# Patient Record
Sex: FEMALE | Race: WHITE | NOT HISPANIC OR LATINO
[De-identification: names, ages, dates, MRNs, and addresses within clinical notes are randomized per-mention and may not be internally consistent; named-entity substitution may affect disease eponyms.]

---

## 2018-01-10 ENCOUNTER — RESULT REVIEW (OUTPATIENT)
Age: 53
End: 2018-01-10

## 2019-08-13 ENCOUNTER — APPOINTMENT (OUTPATIENT)
Dept: OBGYN | Facility: CLINIC | Age: 54
End: 2019-08-13
Payer: MEDICARE

## 2019-08-13 ENCOUNTER — RECORD ABSTRACTING (OUTPATIENT)
Age: 54
End: 2019-08-13

## 2019-08-13 VITALS
WEIGHT: 138 LBS | SYSTOLIC BLOOD PRESSURE: 112 MMHG | HEIGHT: 64 IN | DIASTOLIC BLOOD PRESSURE: 60 MMHG | BODY MASS INDEX: 23.56 KG/M2

## 2019-08-13 DIAGNOSIS — J45.909 UNSPECIFIED ASTHMA, UNCOMPLICATED: ICD-10-CM

## 2019-08-13 DIAGNOSIS — Z87.39 PERSONAL HISTORY OF OTHER DISEASES OF THE MUSCULOSKELETAL SYSTEM AND CONNECTIVE TISSUE: ICD-10-CM

## 2019-08-13 DIAGNOSIS — N93.0 POSTCOITAL AND CONTACT BLEEDING: ICD-10-CM

## 2019-08-13 DIAGNOSIS — N89.8 OTHER SPECIFIED NONINFLAMMATORY DISORDERS OF VAGINA: ICD-10-CM

## 2019-08-13 DIAGNOSIS — J38.1 POLYP OF VOCAL CORD AND LARYNX: ICD-10-CM

## 2019-08-13 DIAGNOSIS — Z92.89 PERSONAL HISTORY OF OTHER MEDICAL TREATMENT: ICD-10-CM

## 2019-08-13 DIAGNOSIS — Z87.19 PERSONAL HISTORY OF OTHER DISEASES OF THE DIGESTIVE SYSTEM: ICD-10-CM

## 2019-08-13 DIAGNOSIS — Z80.42 FAMILY HISTORY OF MALIGNANT NEOPLASM OF PROSTATE: ICD-10-CM

## 2019-08-13 DIAGNOSIS — Z78.9 OTHER SPECIFIED HEALTH STATUS: ICD-10-CM

## 2019-08-13 DIAGNOSIS — Z82.49 FAMILY HISTORY OF ISCHEMIC HEART DISEASE AND OTHER DISEASES OF THE CIRCULATORY SYSTEM: ICD-10-CM

## 2019-08-13 DIAGNOSIS — Z87.42 PERSONAL HISTORY OF OTHER DISEASES OF THE FEMALE GENITAL TRACT: ICD-10-CM

## 2019-08-13 DIAGNOSIS — M51.9 UNSPECIFIED THORACIC, THORACOLUMBAR AND LUMBOSACRAL INTERVERTEBRAL DISC DISORDER: ICD-10-CM

## 2019-08-13 DIAGNOSIS — Z83.3 FAMILY HISTORY OF DIABETES MELLITUS: ICD-10-CM

## 2019-08-13 DIAGNOSIS — Z80.8 FAMILY HISTORY OF MALIGNANT NEOPLASM OF OTHER ORGANS OR SYSTEMS: ICD-10-CM

## 2019-08-13 DIAGNOSIS — Z82.62 FAMILY HISTORY OF OSTEOPOROSIS: ICD-10-CM

## 2019-08-13 DIAGNOSIS — Z80.0 FAMILY HISTORY OF MALIGNANT NEOPLASM OF DIGESTIVE ORGANS: ICD-10-CM

## 2019-08-13 DIAGNOSIS — G47.9 SLEEP DISORDER, UNSPECIFIED: ICD-10-CM

## 2019-08-13 DIAGNOSIS — M77.8 OTHER ENTHESOPATHIES, NOT ELSEWHERE CLASSIFIED: ICD-10-CM

## 2019-08-13 DIAGNOSIS — Z87.448 PERSONAL HISTORY OF OTHER DISEASES OF URINARY SYSTEM: ICD-10-CM

## 2019-08-13 LAB — CYTOLOGY CVX/VAG DOC THIN PREP: NORMAL

## 2019-08-13 PROCEDURE — G0101: CPT

## 2019-08-13 PROCEDURE — 99396 PREV VISIT EST AGE 40-64: CPT | Mod: GY

## 2019-08-13 RX ORDER — LACTOBACILLUS ACIDOPHILUS 0.5 MG
TABLET ORAL
Refills: 0 | Status: ACTIVE | COMMUNITY

## 2019-08-13 RX ORDER — THEOPHYLLINE 100 MG/1
100 TABLET, EXTENDED RELEASE ORAL
Refills: 0 | Status: ACTIVE | COMMUNITY

## 2019-08-13 RX ORDER — IBUPROFEN 800 MG
TABLET ORAL
Refills: 0 | Status: DISCONTINUED | COMMUNITY
End: 2019-08-13

## 2019-08-13 RX ORDER — LEVALBUTEROL TARTRATE 45 UG/1
45 AEROSOL, METERED ORAL
Refills: 0 | Status: ACTIVE | COMMUNITY

## 2019-08-13 RX ORDER — IBUPROFEN 600 MG
600 TABLET ORAL
Refills: 0 | Status: ACTIVE | COMMUNITY

## 2019-08-14 NOTE — REVIEW OF SYSTEMS
[Sight Problems] : sight problems [Abdominal Pain] : abdominal pain [Headache] : headache [Sleep Disturbances] : sleep disturbances [Nl] : Integumentary [Fever] : no fever [Dyspnea] : no shortness of breath [Chills] : no chills [Vomiting] : no vomiting [Pelvic Pain] : no pelvic pain [Frequency] : no frequency [Abn Vag Bleeding] : no abnormal vaginal bleeding [FreeTextEntry1] : motor disturbance sensory disturbance  low libido

## 2019-08-14 NOTE — PHYSICAL EXAM
[Awake] : awake [Alert] : alert [Mass] : no breast mass [Acute Distress] : no acute distress [Nipple Discharge] : no nipple discharge [Axillary LAD] : no axillary lymphadenopathy [Soft] : soft [Tender] : non tender [Distended] : not distended [Flat Affect] : affect not flat [Depressed Mood] : not depressed [Oriented x3] : oriented to person, place, and time [Normal] : uterus [Labia Majora] : labia major [Labia Minora] : labia minora [No Bleeding] : there was no active vaginal bleeding [Atrophy] : atrophy [Pap Obtained] : a Pap smear was performed [Tenderness] : nontender [Uterine Adnexae] : were not tender and not enlarged

## 2019-08-14 NOTE — HISTORY OF PRESENT ILLNESS
[1 Year Ago] : 1 year ago [Fair] : being in fair health [Healthy Diet] : a healthy diet [Last Mammogram ___] : Last Mammogram was [unfilled] [Last Pap ___] : Last cervical pap smear was [unfilled] [Last Bone Density ___] : Last bone density studies [unfilled] [Hot Flashes] : hot flashes [Definite:  ___ (Date)] : the last menstrual period was [unfilled] [Menarche Age: ____] : age at menarche was [unfilled] [Postmenopausal] : is postmenopausal [Regular Exercise] : not exercising regularly [Weight Concerns] : no concerns with her weight [Menstrual Problems] : reports normal menses [Pregnancy History] : denies prior pregnancies [de-identified] : pelvic ultrasound 2010 breast ultrasound 0/12/2018 [Night Sweats] : no night sweats [Vaginal Itching] : no vaginal itching [Dyspareunia] : no dyspareunia [Mood Changes] : no mood changes [Sexually Active] : is not sexually active [Contraception] : does not use contraception

## 2019-08-24 LAB
CYTOLOGY CVX/VAG DOC THIN PREP: ABNORMAL
HPV HIGH+LOW RISK DNA PNL CVX: NOT DETECTED

## 2020-03-11 ENCOUNTER — APPOINTMENT (OUTPATIENT)
Dept: OBGYN | Facility: CLINIC | Age: 55
End: 2020-03-11

## 2020-03-31 ENCOUNTER — APPOINTMENT (OUTPATIENT)
Dept: OBGYN | Facility: CLINIC | Age: 55
End: 2020-03-31

## 2020-08-17 ENCOUNTER — APPOINTMENT (OUTPATIENT)
Dept: OBGYN | Facility: CLINIC | Age: 55
End: 2020-08-17
Payer: MEDICARE

## 2020-08-17 VITALS
WEIGHT: 129 LBS | HEIGHT: 64 IN | BODY MASS INDEX: 22.02 KG/M2 | SYSTOLIC BLOOD PRESSURE: 110 MMHG | DIASTOLIC BLOOD PRESSURE: 68 MMHG

## 2020-08-17 DIAGNOSIS — Z12.31 ENCOUNTER FOR SCREENING MAMMOGRAM FOR MALIGNANT NEOPLASM OF BREAST: ICD-10-CM

## 2020-08-17 DIAGNOSIS — Z78.9 OTHER SPECIFIED HEALTH STATUS: ICD-10-CM

## 2020-08-17 DIAGNOSIS — K46.9 UNSPECIFIED ABDOMINAL HERNIA W/OUT OBSTRUCTION OR GANGRENE: ICD-10-CM

## 2020-08-17 DIAGNOSIS — M50.90 CERVICAL DISC DISORDER, UNSPECIFIED, UNSPECIFIED CERVICAL REGION: ICD-10-CM

## 2020-08-17 DIAGNOSIS — K57.32 DIVERTICULITIS OF LARGE INTESTINE W/OUT PERFORATION OR ABSCESS W/OUT BLEEDING: ICD-10-CM

## 2020-08-17 DIAGNOSIS — Z01.411 ENCOUNTER FOR GYNECOLOGICAL EXAMINATION (GENERAL) (ROUTINE) WITH ABNORMAL FINDINGS: ICD-10-CM

## 2020-08-17 DIAGNOSIS — K21.0 GASTRO-ESOPHAGEAL REFLUX DISEASE WITH ESOPHAGITIS: ICD-10-CM

## 2020-08-17 DIAGNOSIS — Z01.419 ENCOUNTER FOR GYNECOLOGICAL EXAMINATION (GENERAL) (ROUTINE) W/OUT ABNORMAL FINDINGS: ICD-10-CM

## 2020-08-17 DIAGNOSIS — I67.1 CEREBRAL ANEURYSM, NONRUPTURED: ICD-10-CM

## 2020-08-17 DIAGNOSIS — H04.129 DRY EYE SYNDROME OF UNSPECIFIED LACRIMAL GLAND: ICD-10-CM

## 2020-08-17 DIAGNOSIS — Z63.5 DISRUPTION OF FAMILY BY SEPARATION AND DIVORCE: ICD-10-CM

## 2020-08-17 PROCEDURE — 36415 COLL VENOUS BLD VENIPUNCTURE: CPT

## 2020-08-17 PROCEDURE — G0101: CPT

## 2020-08-17 PROCEDURE — 99396 PREV VISIT EST AGE 40-64: CPT | Mod: GY

## 2020-08-17 SDOH — SOCIAL STABILITY - SOCIAL INSECURITY: DISRUPTION OF FAMILY BY SEPARATION AND DIVORCE: Z63.5

## 2020-08-18 RX ORDER — ECHINACEA 400 MG
575 CAPSULE ORAL
Refills: 0 | Status: ACTIVE | COMMUNITY

## 2020-08-18 RX ORDER — UBIDECARENONE 50 MG
CAPSULE ORAL
Refills: 0 | Status: ACTIVE | COMMUNITY

## 2020-08-18 RX ORDER — ALPRAZOLAM 0.25 MG/1
0.25 TABLET ORAL
Qty: 90 | Refills: 0 | Status: ACTIVE | COMMUNITY
Start: 2020-05-11

## 2020-08-18 RX ORDER — EPINEPHRINE 0.3 MG/.3ML
0.3 INJECTION INTRAMUSCULAR
Qty: 2 | Refills: 0 | Status: ACTIVE | COMMUNITY
Start: 2020-07-24

## 2020-08-18 RX ORDER — DOXYCYCLINE 50 MG/1
50 TABLET, FILM COATED ORAL
Qty: 30 | Refills: 0 | Status: ACTIVE | COMMUNITY
Start: 2020-07-28

## 2020-08-18 NOTE — END OF VISIT
[FreeTextEntry3] : I, Mary Anne Narayanan, solely acted as scribe for Dr. Karlee Llanes on 08/17/2020 .\par All medical entries made by the Scribe were at my, Dr. Llanes's, direction and personally dictated by me on 08/17/2020. I have reviewed the chart and agree that the record accurately reflects my personal performance of the history, physical exam, assessment and plan. I have also personally directed, reviewed, and agreed with the chart.

## 2020-08-18 NOTE — REVIEW OF SYSTEMS
[Nl] : Integumentary [Joint Pain] : joint pain [SOB on Exertion] : shortness of breath during exertion [Dizziness] : dizziness [Sleep Disturbances] : sleep disturbances [Headache] : headache [Pelvic Pain] : no pelvic pain [Chills] : no chills [Fever] : no fever [FreeTextEntry1] : WEIGHT LOSS, ASTHMA  [Abn Vag Bleeding] : no abnormal vaginal bleeding

## 2020-08-18 NOTE — PHYSICAL EXAM
[Awake] : awake [Alert] : alert [Examination Of The Breasts] : a normal appearance [No Discharge] : no discharge [No Masses] : no breast masses were palpable [Soft] : soft [Oriented x3] : oriented to person, place, and time [Labia Majora] : labia major [Labia Minora] : labia minora [Normal] : clitoris [No Bleeding] : there was no active vaginal bleeding [Pap Obtained] : a Pap smear was performed [Uterine Adnexae] : were not tender and not enlarged [Patient Refused] : was refused by the patient [Acute Distress] : no acute distress [Mass] : no breast mass [Nipple Discharge] : no nipple discharge [Distended] : not distended [Axillary LAD] : no axillary lymphadenopathy [Tender] : non tender [Depressed Mood] : not depressed [Flat Affect] : affect not flat [Dry Mucosa] : dry mucosa [Atrophy] : atrophy [Tenderness] : nontender [Adnexa Tenderness] : were not tender

## 2020-08-18 NOTE — HISTORY OF PRESENT ILLNESS
[1 Year Ago] : 1 year ago [Good] : being in good health [Last Pap ___] : Last cervical pap smear was [unfilled] [Menarche Age: ____] : age at menarche was [unfilled] [Last Mammogram ___] : Last Mammogram was [unfilled] [Last Bone Density ___] : Last bone density studies [unfilled] [Postmenopausal] : is postmenopausal [Last Colonoscopy ___] : Last colonoscopy [unfilled] [No] : no [Menstrual Problems] : reports normal menses [Pregnancy History] : denies prior pregnancies [Contraception] : does not use contraception [Sexually Active] : is not sexually active

## 2020-08-19 LAB
C TRACH RRNA SPEC QL NAA+PROBE: NOT DETECTED
HAV IGM SER QL: NONREACTIVE
HBV CORE IGM SER QL: NONREACTIVE
HBV SURFACE AG SER QL: NONREACTIVE
HCV AB SER QL: NONREACTIVE
HCV S/CO RATIO: 0.09 S/CO
HIV1+2 AB SPEC QL IA.RAPID: NONREACTIVE
HPV HIGH+LOW RISK DNA PNL CVX: NOT DETECTED
N GONORRHOEA RRNA SPEC QL NAA+PROBE: NOT DETECTED
SOURCE TP AMPLIFICATION: NORMAL
T PALLIDUM AB SER QL IA: NEGATIVE

## 2020-08-21 LAB — CYTOLOGY CVX/VAG DOC THIN PREP: ABNORMAL

## 2020-12-23 PROBLEM — Z12.31 ENCOUNTER FOR SCREENING MAMMOGRAM FOR BREAST CANCER: Status: RESOLVED | Noted: 2019-08-13 | Resolved: 2020-12-23

## 2022-07-21 ENCOUNTER — APPOINTMENT (OUTPATIENT)
Dept: ORTHOPEDIC SURGERY | Facility: CLINIC | Age: 57
End: 2022-07-21

## 2022-07-21 VITALS — BODY MASS INDEX: 22.88 KG/M2 | WEIGHT: 134 LBS | HEIGHT: 64 IN

## 2022-07-21 PROCEDURE — 73080 X-RAY EXAM OF ELBOW: CPT | Mod: RT

## 2022-07-21 PROCEDURE — 99072 ADDL SUPL MATRL&STAF TM PHE: CPT

## 2022-07-21 PROCEDURE — 99205 OFFICE O/P NEW HI 60 MIN: CPT

## 2022-07-21 PROCEDURE — 73564 X-RAY EXAM KNEE 4 OR MORE: CPT | Mod: LT

## 2022-07-23 NOTE — HISTORY OF PRESENT ILLNESS
[de-identified] : The patient is a 56 year old right hand dominant female who presents today complaining of right elbow and left knee pain .\par Date of Injury/Onset: 10/14/2011\par Pain:    At Rest: 6/10 \par With Activity:  8/10 \par Mechanism of injury: slipped and fell on a leak at work injuring her right elbow and left knee\par This is a Work Related Injury being treated under Worker's Compensation.\par This is not an athletic injury occurring associated with an interscholastic or organized sports team.\par Quality of symptoms: aching pain, sharp pain with movement, weakness/instability\par Improves with: rest, ice, NSAID's\par Worse with: overuse, any increased activity\par Prior treatment: Dr. Ortiz, gel injections, PT\par Prior Imaging: no recent imaging\par Out of work/sport: not working\par School/Sport/Position/Occupation: retired/disabled\par Additional Information: fractured patella in left knee and had surgery 2001\par \par

## 2022-07-23 NOTE — IMAGING
[Right] : right elbow [There are no fractures, subluxations or dislocations. No significant abnormalities are seen] : There are no fractures, subluxations or dislocations. No significant abnormalities are seen [Left] : left knee [All Views] : anteroposterior, lateral, skyline, and anteroposterior standing [de-identified] : The patient is a well appearing 56 year old F of their stated age.\par Patient ambulates with a normal gait.\par Negative straight leg raise bilateral\par \par Effected Knee:  LEFT                      	\par ROM:  0-145 degrees\par Lachman: Negative\par Pivot Shift: Negative\par Anterior Drawer: Negative\par Posterior Drawer / Sag:Negative\par Varus Stress 0 degrees: Stable\par Varus Stress 30 degrees: Stable\par Valgus Stress 0 degrees: Stable\par Valgus Stress 30 degrees: Stable\par Medial Anali: Negative\par Lateral Anali: Negative\par Patella Glide: 2+\par Patella Apprehension: Negative\par Patella Grind: Negative\par \par Palpation:\par Medial Joint Line: Nontender\par Lateral Joint Line: Nontender\par Medial Collateral Ligament: Nontender\par Lateral Collateral Ligament/PLC: Nontender\par Medial Femoral Condyle: Nontender\par Medial Retinaculum: Nontender\par Distal Femur: Nontender\par Proximal Tibia: Nontender\par Tibial Tubercle: Nontender\par Distal Pole Patella: Nontender\par Quadriceps Tendon: Nontender &  Intact\par Patella Tendon: Nontender &  Intact\par Lateral Patella Facet: tender\par Medial Distal Hamstring/PES: Nontender\par Lateral Distal Hamstring: Nontender & Stable\par Iliotibial Band: Nontender\par Medial Patellofemoral Ligament: Nontender\par Adductor: Nontender\par Proximal GSC-Plantaris: Nontender\par Calf: Supple & Nontender\par \par Inspection:\par Deformity: No\par Erythema: No\par Ecchymosis: No\par Abrasions: No\par Effusion: mild\par Prepatella Bursitis: No\par Neurologic Exam:\par Sensation L4-S1: Grossly Intact\par \par Motor Exam:\par Quadriceps: 5 out of 5\par Hamstrings: 5 out of 5\par EHL: 5 out of 5\par FHL: 5 out of 5\par TA: 5 out of 5\par GS: 5 out of 5\par Circulatory/Pulses:\par Dorsalis Pedis: 2+\par Posterior Tibialis: 2+\par Additional Pertinent Findings: None\par Contralateral Knee:                           	\par ROM: 0-145 degrees\par Other Pertinent Findings: None\par \par >>>>>>>>>>>>>>>>>>>>>>>>>>>>>>\par \par The patient is a well appearing 56 year old F of their stated age.\par Neck is supple & nontender to palpation. Negative Spurling's test.\par \par Right Shoulder:       	\par ROM:\par Forward Flexion: 180 degrees\par Abduction: 180 degrees\par ER at 90: 90 degrees\par IR at 90: 45 degrees\par ER at N: 50 degrees\par Motor:\par Abduction: 5 out of 5\par FPS: 5 out of 5\par Flexion: 5 out of 5\par Internal Rotation: 5 out of 5\par External Rotation: 5 out of 5\par Provocative Testing:\par Impingement: Negative\par Forsan's: Negative\par Other: N/A\par \par Left Shoulder:\par ROM:\par Forward Flexion: 180 degrees\par Abduction: 180 degrees\par ER at 90: 90 degrees\par IR at 90: 45 degrees\par ER at N: 50 degrees\par Motor:\par Abduction: 5 out of 5\par FPS: 5 out of 5\par Flexion: 5 out of 5\par Internal Rotation: 5 out of 5\par External Rotation: 5 out of 5\par Provocative Testing:\par Impingement: Negative\par Forsan's: Negative\par Other: N/A\par _____________________\par Effected Elbow: RIGHT\par ROM:\par Flexion: 0-145 degrees\par Supination: 90 degrees\par Pronation: 90 degrees\par \par Inspection:\par Erythema: None\par Ecchymosis: None\par Abrasions: None\par Effusion: None\par Deformity: None\par \par Palpation:\par Crepitus: None\par Medial Epicondyle/Flexor-Pronator: tender\par Lateral Epicondyle/ECRB: Nontender\par Olecranon: Nontender\par Radial Head: Nontender\par Distal Biceps: Nontender\par Distal Triceps:  Nontender\par Flexor-Pronator: Nontender\par Extensor/ECRB: Nontender\par UCL: Nontender\par Pronator Intersection: Nontender\par Ulnar Nerve:  Stable & Nontender\par \par Stress Testing:\par Varus at 0 Degrees: Stable\par Varus at 30 Degrees: Stable\par Valgus at 0 Degrees: Stable\par Valgus at 30 Degrees: Stable\par \par Motor:\par Elbow Flexion: 5 out of 5\par Elbow Extension: 5 out of 5\par Supination: 5 out of 5\par Pronation: 5 out of 5\par Wrist Flexion: 5 out of 5\par Wrist Extension: 5 out of 5\par Interossei: 5 out of 5\par : 5 out of 5\par \par Provocative Testing:\par Milking: Negative\par Moving Valgus Stress: Negative\par Posterolateral R-I: Negative\par Hook Test: Negative\par Resisted Wrist Extension: No Pain\par Resisted Index Finger Extension: No Pain\par Resisted Middle Finger Extension: No Pain\par Resisted Wrist Flexion: No Pain\par Resisted Pronation: No Pain\par \par Neurologic Exam:\par Axillary Nerve:  SLT\par Radial Nerve: SLT\par Median Nerve: SLT\par Ulnar Nerve:  SLT\par Other:  N/A\par Vascular Exam:\par Radial Pulse: 2+\par Ulnar Pulse: 2+\par Capillary Refill: <2 Seconds\par Other Exams: None\par Pertinent Contralateral Elbow Findings: None\par \par Assessment: The patient is a 56 year old F with right elbow pain and left knee pain and radiographic and physical exam findings consistent with left knee PF OA\par \par The patient’s condition is acute\par Documents/Results Reviewed Today: Xray right elbow, Xray left knee\par Tests/Studies Independently Interpreted Today: Xray right elbow is normal -- Xray left knee shows moderate medial joint compartment narrowing and advanced PF OA\par Pertinent findings include: Left knee: mild effusion, lateral patella facet tenderness -- Right elbow: tender medial epicondyle \par Confounding medical conditions/concerns: Sx left knee patella fx - 2001\par \par Plan: Will request authorization for left knee gel injection. \par Tests Ordered: none\par Prescription Medications Ordered: none\par Braces/DME Ordered: none\par Activity/Work/Sports Status: retired/disabled\par Additional Instructions: none\par Follow-Up: for gel injection\par \par \par The patient's current medication management of their orthopedic diagnosis was reviewed today:\par (1) We discussed a comprehensive treatment plan that included possible pharmaceutical management involving the use of prescription strength medications including but not limited to options such as oral Naprosyn 500mg BID, once daily Meloxicam 15 mg, or 500-650 mg Tylenol versus over the counter oral medications and topical prescription NSAID Pennsaid vs over the counter Voltaren gel.\par (2) There is a moderate risk of morbidity with further treatment, especially from use of prescription strength medications and possible side effects of these medications which include upset stomach with oral medications, skin reactions to topical medications and cardiac/renal issues with long term use.\par (3) I recommended that the patient follow-up with their medical physician to discuss any significant specific potential issues with long term medication use such as interactions with current medications or with exacerbation of underlying medical comorbidities.\par (4) The benefits and risks associated with use of injectable, oral or topical, prescription and over the counter anti-inflammatory medications were discussed with the patient. The patient voiced understanding of the risks including but not limited to bleeding, stroke, kidney dysfunction, heart disease, and were referred to the black box warning label for further information.\par \par \par I, Vernon Portillo, attest that this documentation has been prepared under the direction and in the presence of Provider Dr. Cisneros\par \par The documentation recorded by the scribe accurately reflects the service I personally performed and the decisions made by me.\par \par  [FreeTextEntry9] : moderate medial compartment OA and advanced PF OA

## 2022-08-04 ENCOUNTER — APPOINTMENT (OUTPATIENT)
Dept: ORTHOPEDIC SURGERY | Facility: CLINIC | Age: 57
End: 2022-08-04

## 2022-08-04 VITALS — HEIGHT: 64 IN | BODY MASS INDEX: 22.53 KG/M2 | WEIGHT: 132 LBS

## 2022-08-04 DIAGNOSIS — J45.909 UNSPECIFIED ASTHMA, UNCOMPLICATED: ICD-10-CM

## 2022-08-04 PROCEDURE — 99214 OFFICE O/P EST MOD 30 MIN: CPT

## 2022-08-04 PROCEDURE — 99072 ADDL SUPL MATRL&STAF TM PHE: CPT

## 2022-08-04 PROCEDURE — 73564 X-RAY EXAM KNEE 4 OR MORE: CPT | Mod: RT

## 2022-08-04 NOTE — WORK
[Sprain/Strain] : sprain/strain [Was the competent medical cause of the injury] : was the competent medical cause of the injury [Are consistent with the injury] : are consistent with the injury [Consistent with my objective findings] : consistent with my objective findings [Partial] : partial [Does not reveal pre-existing condition(s) that may affect treatment/prognosis] : does not reveal pre-existing condition(s) that may affect treatment/prognosis [I provided the services listed above] :  I provided the services listed above.

## 2022-08-08 NOTE — HISTORY OF PRESENT ILLNESS
[de-identified] : The patient is a 56 year old right hand dominant female who presents today complaining of right knee pain. \par Date of Injury/Onset: 11/23/2006 \par Pain:    At Rest: 2/10 \par With Activity:  7-8/10 \par Mechanism of injury: Slipped on an object at work and injured her right knee. \par This is a Work Related Injury being treated under Worker's Compensation.\par This is not an athletic injury occurring associated with an interscholastic or organized sports team.\par Quality of symptoms: Grinding, popping, dull and minimal sharp pain \par Improves with: Rest, Ice, heat, Motrin \par Worse with: activities and prolonged sitting, walking and climbing up stairs \par Prior treatment: Right ACL repair and PT \par Prior Imaging: None \par Out of work/sport: Yes, since 08/2012 \par School/Sport/Position/Occupation: RN \par Additional Information: [None]\par \par

## 2022-08-08 NOTE — IMAGING
[Right] : right knee [AP] : anteroposterior [Lateral] : lateral [[place]] : skyline [There are no fractures, subluxations or dislocations. No significant abnormalities are seen] : There are no fractures, subluxations or dislocations. No significant abnormalities are seen [de-identified] : The patient is a well appearing 56 year old F of their stated age.\par Patient ambulates with a antalgic gait.\par Negative straight leg raise bilateral\par \par Effected Knee:  RIGHT 	\par ROM:  0-145 degrees\par Lachman: Negative\par Pivot Shift: Negative\par Anterior Drawer: Negative\par Posterior Drawer / Sag:Negative\par Varus Stress 0 degrees: Stable\par Varus Stress 30 degrees: Stable\par Valgus Stress 0 degrees: Stable\par Valgus Stress 30 degrees: Stable\par Medial Anali: Negative\par Lateral Anali: Negative\par Patella Glide: 2+\par Patella Apprehension: Negative\par Patella Grind: +\par \par Palpation:\par Medial Joint Line: Nontender\par Lateral Joint Line: Nontender\par Medial Collateral Ligament: Nontender\par Lateral Collateral Ligament/PLC: Nontender\par Medial Femoral Condyle: Nontender\par Medial Retinaculum: Nontender\par Distal Femur: Nontender\par Proximal Tibia: Nontender\par Tibial Tubercle: Nontender\par Distal Pole Patella: Nontender\par Quadriceps Tendon: Nontender &  Intact\par Patella Tendon: Nontender &  Intact\par Lateral Patella Facet: Nontender\par Medial Distal Hamstring/PES: Nontender\par Lateral Distal Hamstring: Nontender & Stable\par Iliotibial Band: Nontender\par Medial Patellofemoral Ligament: Nontender\par Adductor: Nontender\par Proximal GSC-Plantaris: Nontender\par Calf: Supple & Nontender\par \par Inspection:\par Deformity: No\par Erythema: No\par Ecchymosis: No\par Abrasions: No\par Effusion: No\par Prepatella Bursitis: No\par Neurologic Exam:\par Sensation L4-S1: Grossly Intact\par \par Motor Exam:\par Quadriceps: 5 out of 5\par Hamstrings: 5 out of 5\par EHL: 5 out of 5\par FHL: 5 out of 5\par TA: 5 out of 5\par GS: 5 out of 5\par Circulatory/Pulses:\par Dorsalis Pedis: 2+\par Posterior Tibialis: 2+\par Additional Pertinent Findings: none \par Contralateral Knee:                           	\par ROM: 0-145 degrees\par Other Pertinent Findings: none \par \par Assessment: The patient is a 56 year old F with left knee pain and radiographic and physical exam findings consistent with right knee PF OA\par \par The patient’s condition is acute\par Documents/Results Reviewed Today: Xray right knee\par Tests/Studies Independently Interpreted Today: Xray right knee shows mild to moderate PF OA\par Pertinent findings include: patellofemoral crepitus \par Confounding medical conditions/concerns: none \par \par Plan: Patient will await authorization for GelOne injections. Modify activity as discussed. \par Tests Ordered: none\par Prescription Medications Ordered: none\par Braces/DME Ordered: none\par Activity/Work/Sports Status: retired/disabled\par Additional Instructions: none\par Follow-Up: for gel injections\par \par \par The patient's current medication management of their orthopedic diagnosis was reviewed today:\par (1) We discussed a comprehensive treatment plan that included possible pharmaceutical management involving the use of prescription strength medications including but not limited to options such as oral Naprosyn 500mg BID, once daily Meloxicam 15 mg, or 500-650 mg Tylenol versus over the counter oral medications and topical prescription NSAID Pennsaid vs over the counter Voltaren gel.\par (2) There is a moderate risk of morbidity with further treatment, especially from use of prescription strength medications and possible side effects of these medications which include upset stomach with oral medications, skin reactions to topical medications and cardiac/renal issues with long term use.\par (3) I recommended that the patient follow-up with their medical physician to discuss any significant specific potential issues with long term medication use such as interactions with current medications or with exacerbation of underlying medical comorbidities.\par (4) The benefits and risks associated with use of injectable, oral or topical, prescription and over the counter anti-inflammatory medications were discussed with the patient. The patient voiced understanding of the risks including but not limited to bleeding, stroke, kidney dysfunction, heart disease, and were referred to the black box warning label for further information.\par \par \par I, Juli Ruffin, attest that this documentation has been prepared under the direction and in the presence of Provider Dr. Michele Cisneros.\par \par The documentation recorded by the scribe accurately reflects the service I personally performed and the decisions made by me.\par The patient was seen by me under the direct supervision of Dr. Michele Cisneros\par \par  [FreeTextEntry9] : mild to moderate patellofemoral OA

## 2022-09-01 ENCOUNTER — APPOINTMENT (OUTPATIENT)
Dept: OBGYN | Facility: CLINIC | Age: 57
End: 2022-09-01

## 2022-09-01 VITALS
BODY MASS INDEX: 22.53 KG/M2 | HEIGHT: 64 IN | SYSTOLIC BLOOD PRESSURE: 122 MMHG | DIASTOLIC BLOOD PRESSURE: 72 MMHG | WEIGHT: 132 LBS

## 2022-09-01 DIAGNOSIS — Z12.31 ENCOUNTER FOR SCREENING MAMMOGRAM FOR MALIGNANT NEOPLASM OF BREAST: ICD-10-CM

## 2022-09-01 DIAGNOSIS — Z11.3 ENCOUNTER FOR SCREENING FOR INFECTIONS WITH A PREDOMINANTLY SEXUAL MODE OF TRANSMISSION: ICD-10-CM

## 2022-09-01 DIAGNOSIS — Z12.11 ENCOUNTER FOR SCREENING FOR MALIGNANT NEOPLASM OF COLON: ICD-10-CM

## 2022-09-01 DIAGNOSIS — Z01.419 ENCOUNTER FOR GYNECOLOGICAL EXAMINATION (GENERAL) (ROUTINE) W/OUT ABNORMAL FINDINGS: ICD-10-CM

## 2022-09-01 DIAGNOSIS — R92.2 INCONCLUSIVE MAMMOGRAM: ICD-10-CM

## 2022-09-01 DIAGNOSIS — M85.80 OTHER SPECIFIED DISORDERS OF BONE DENSITY AND STRUCTURE, UNSPECIFIED SITE: ICD-10-CM

## 2022-09-01 DIAGNOSIS — Z01.411 ENCOUNTER FOR GYNECOLOGICAL EXAMINATION (GENERAL) (ROUTINE) WITH ABNORMAL FINDINGS: ICD-10-CM

## 2022-09-01 PROCEDURE — 99396 PREV VISIT EST AGE 40-64: CPT | Mod: GY

## 2022-09-01 PROCEDURE — G0101: CPT

## 2022-09-01 RX ORDER — THEOPHYLLINE ANHYDROUS 100 MG/1
100 CAPSULE, EXTENDED RELEASE ORAL
Qty: 90 | Refills: 0 | Status: DISCONTINUED | COMMUNITY
Start: 2020-08-12 | End: 2022-09-01

## 2022-09-01 RX ORDER — DOXYCLYCLINE HYCLATE 150 MG/1
TABLET, COATED ORAL
Refills: 0 | Status: DISCONTINUED | COMMUNITY
End: 2022-09-01

## 2022-09-01 RX ORDER — CHOLECALCIFEROL (VITAMIN D3) 25 MCG
TABLET ORAL
Refills: 0 | Status: DISCONTINUED | COMMUNITY
End: 2022-09-01

## 2022-09-01 RX ORDER — LIFITEGRAST 50 MG/ML
5 SOLUTION/ DROPS OPHTHALMIC
Qty: 180 | Refills: 0 | Status: DISCONTINUED | COMMUNITY
Start: 2020-08-07 | End: 2022-09-01

## 2022-09-01 RX ORDER — DEXAMETHASONE SODIUM PHOSPHATE 10 MG/ML
10 INJECTION, SOLUTION INTRAMUSCULAR; INTRAVENOUS
Qty: 10 | Refills: 0 | Status: DISCONTINUED | COMMUNITY
Start: 2020-08-12 | End: 2022-09-01

## 2022-09-01 RX ORDER — CYCLOSPORINE 0.5 MG/ML
0.05 EMULSION OPHTHALMIC
Qty: 180 | Refills: 0 | Status: DISCONTINUED | COMMUNITY
Start: 2020-02-20 | End: 2022-09-01

## 2022-09-01 RX ORDER — ZINC SULFATE 50(220)MG
CAPSULE ORAL
Refills: 0 | Status: DISCONTINUED | COMMUNITY
End: 2022-09-01

## 2022-09-01 RX ORDER — CETIRIZINE HYDROCHLORIDE 10 MG/1
10 CAPSULE, LIQUID FILLED ORAL
Refills: 0 | Status: DISCONTINUED | COMMUNITY
End: 2022-09-01

## 2022-09-01 RX ORDER — MULTIVIT-MIN/IRON/FOLIC ACID/K 18-600-40
CAPSULE ORAL
Refills: 0 | Status: DISCONTINUED | COMMUNITY
End: 2022-09-01

## 2022-09-01 RX ORDER — TOBRAMYCIN 3 MG/ML
0.3 SOLUTION/ DROPS OPHTHALMIC
Qty: 5 | Refills: 0 | Status: DISCONTINUED | COMMUNITY
Start: 2020-08-07 | End: 2022-09-01

## 2022-09-01 RX ORDER — AZITHROMYCIN 250 MG/1
250 TABLET, FILM COATED ORAL
Qty: 6 | Refills: 0 | Status: DISCONTINUED | COMMUNITY
Start: 2020-08-12 | End: 2022-09-01

## 2022-09-01 RX ORDER — TOBRAMYCIN AND DEXAMETHASONE 3; 1 MG/ML; MG/ML
0.3-0.1 SUSPENSION/ DROPS OPHTHALMIC
Qty: 5 | Refills: 0 | Status: DISCONTINUED | COMMUNITY
Start: 2020-05-22 | End: 2022-09-01

## 2022-09-01 RX ORDER — NEOMYCIN SULFATE, POLYMYXIN B SULFATE AND DEXAMETHASONE 3.5; 10000; 1 MG/ML; [USP'U]/ML; MG/ML
3.5-10000-0.1 SUSPENSION OPHTHALMIC
Qty: 5 | Refills: 0 | Status: DISCONTINUED | COMMUNITY
Start: 2020-06-23 | End: 2022-09-01

## 2022-09-01 RX ORDER — ALPRAZOLAM 2 MG/1
2 TABLET ORAL
Refills: 0 | Status: DISCONTINUED | COMMUNITY
End: 2022-09-01

## 2022-09-04 PROBLEM — Z11.3 SCREENING FOR STDS (SEXUALLY TRANSMITTED DISEASES): Status: RESOLVED | Noted: 2020-08-17 | Resolved: 2022-09-04

## 2022-09-04 PROBLEM — Z01.411 ENCOUNTER FOR WELL WOMAN EXAM WITH ABNORMAL FINDINGS: Status: RESOLVED | Noted: 2020-08-17 | Resolved: 2022-09-04

## 2022-09-04 LAB — HPV HIGH+LOW RISK DNA PNL CVX: NOT DETECTED

## 2022-09-04 NOTE — HISTORY OF PRESENT ILLNESS
[N] : Patient denies prior pregnancies [unknown] : Patient is unsure of the date of her LMP [Menarche Age: ____] : age at menarche was [unfilled] [FreeTextEntry1] : \par 58 yo here for an annual exam. She will likely need another hernia surgery. She will also need bilateral shoulder surgery. [PapSmeardate] : 08/17/2020 [TextBox_31] : ATROPHIC [HIVDate] : 08/17/2020 [TextBox_53] : NEG [SyphilisDate] : 08/17/2020 [TextBox_58] : NEG [GonorrheaDate] : 08/17/2020 [TextBox_63] : NEG [ChlamydiaDate] : 08/17/2020 [TextBox_68] : NEG [HPVDate] : 08/17/2020 [TextBox_78] : NEG [HepatitisBDate] : 08/17/2020 [TextBox_83] : NEG [HepatitisCDate] : 08/17/2020 [TextBox_88] : NEG [PGHxTotal] : 0

## 2022-09-04 NOTE — PHYSICAL EXAM
[Appropriately responsive] : appropriately responsive [Alert] : alert [No Acute Distress] : no acute distress [Soft] : soft [Non-tender] : non-tender [Non-distended] : non-distended [Oriented x3] : oriented x3 [Examination Of The Breasts] : a normal appearance [No Masses] : no breast masses were palpable [Labia Majora] : normal [Labia Minora] : normal [Atrophy] : atrophy [Dry Mucosa] : dry mucosa [Normal] : normal [Uterine Adnexae] : normal

## 2022-09-08 ENCOUNTER — APPOINTMENT (OUTPATIENT)
Dept: ORTHOPEDIC SURGERY | Facility: CLINIC | Age: 57
End: 2022-09-08

## 2022-09-08 VITALS — HEIGHT: 64 IN | WEIGHT: 132 LBS | BODY MASS INDEX: 22.53 KG/M2

## 2022-09-08 PROCEDURE — 20611 DRAIN/INJ JOINT/BURSA W/US: CPT | Mod: LT

## 2022-09-08 PROCEDURE — 99072 ADDL SUPL MATRL&STAF TM PHE: CPT

## 2022-09-08 PROCEDURE — 99214 OFFICE O/P EST MOD 30 MIN: CPT | Mod: 25

## 2022-09-08 NOTE — WORK
[Sprain/Strain] : sprain/strain [Was the competent medical cause of the injury] : was the competent medical cause of the injury [Are consistent with the injury] : are consistent with the injury [Consistent with my objective findings] : consistent with my objective findings [Partial] : partial [Does not reveal pre-existing condition(s) that may affect treatment/prognosis] : does not reveal pre-existing condition(s) that may affect treatment/prognosis [I provided the services listed above] :  I provided the services listed above. [No Rx restrictions] : No Rx restrictions.

## 2022-09-08 NOTE — HISTORY OF PRESENT ILLNESS
[de-identified] : The patient is a 56 year old right hand dominant female who presents today for left knee possible gel injection therapy. \par Date of Injury/Onset: 11/23/2006 \par Pain:    At Rest: 2/10 \par With Activity:  7-8/10 \par Mechanism of injury: Slipped on an object at work and injured her right knee. \par This is a Work Related Injury being treated under Worker's Compensation.\par This is not an athletic injury occurring associated with an interscholastic or organized sports team.\par Quality of symptoms: Grinding, popping, dull and minimal sharp pain \par Improves with: Rest, Ice, heat, Motrin \par Worse with: activities and prolonged sitting, walking and climbing up stairs \par  \par Out of work/sport: Yes, since 08/2012 \par School/Sport/Position/Occupation: RN \par Changes since last visit: No changes. Here for left knee injection.\par Additional Information: [None]\par \par

## 2022-09-08 NOTE — IMAGING
[Right] : right knee [AP] : anteroposterior [Lateral] : lateral [[place]] : sk[place] [There are no fractures, subluxations or dislocations. No significant abnormalities are seen] : There are no fractures, subluxations or dislocations. No significant abnormalities are seen [FreeTextEntry9] : mild to moderate patellofemoral OA  [de-identified] : The patient is a well appearing 56 year old F of their stated age.\par Patient ambulates with a normal gait.\par Negative straight leg raise bilateral\par \par Effected Knee:  LEFT                      	\par ROM:  0-145 degrees\par Lachman: Negative\par Pivot Shift: Negative\par Anterior Drawer: Negative\par Posterior Drawer / Sag:Negative\par Varus Stress 0 degrees: Stable\par Varus Stress 30 degrees: Stable\par Valgus Stress 0 degrees: Stable\par Valgus Stress 30 degrees: Stable\par Medial Anali: Negative\par Lateral Anali: Negative\par Patella Glide: 2+\par Patella Apprehension: Negative\par Patella Grind: Negative\par \par Palpation:\par Medial Joint Line: Nontender\par Lateral Joint Line: Nontender\par Medial Collateral Ligament: Nontender\par Lateral Collateral Ligament/PLC: Nontender\par Medial Femoral Condyle: Nontender\par Medial Retinaculum: Nontender\par Distal Femur: Nontender\par Proximal Tibia: Nontender\par Tibial Tubercle: Nontender\par Distal Pole Patella: Nontender\par Quadriceps Tendon: Nontender &  Intact\par Patella Tendon: Nontender &  Intact\par Lateral Patella Facet: tender\par Medial Distal Hamstring/PES: Nontender\par Lateral Distal Hamstring: Nontender & Stable\par Iliotibial Band: Nontender\par Medial Patellofemoral Ligament: Nontender\par Adductor: Nontender\par Proximal GSC-Plantaris: Nontender\par Calf: Supple & Nontender\par \par Inspection:\par Deformity: No\par Erythema: No\par Ecchymosis: No\par Abrasions: No\par Effusion: mild\par Prepatella Bursitis: No\par Neurologic Exam:\par Sensation L4-S1: Grossly Intact\par \par Motor Exam:\par Quadriceps: 5 out of 5\par Hamstrings: 5 out of 5\par EHL: 5 out of 5\par FHL: 5 out of 5\par TA: 5 out of 5\par GS: 5 out of 5\par Circulatory/Pulses:\par Dorsalis Pedis: 2+\par Posterior Tibialis: 2+\par Additional Pertinent Findings: None\par Contralateral Knee:                           	\par ROM: 0-145 degrees\par Other Pertinent Findings: None\par \par \par Assessment: The patient is a 56 year old F with left knee PF OA\par \par The patient’s condition is acute\par Documents/Results Reviewed Today: none\par Tests/Studies Independently Interpreted Today: none\par Pertinent findings include: Left knee: mild effusion, lateral patella facet tenderness \par Confounding medical conditions/concerns: Sx left knee patella fx - 2001\par \par Plan: Patient elected to receive left knee Gel One injection. \par Tests Ordered: none\par Prescription Medications Ordered: none\par Braces/DME Ordered: none\par Activity/Work/Sports Status: retired/disabled\par Additional Instructions: none\par Follow-Up: 6 months\par \par Procedure Note: Musculoskeletal Injection\par  \par Diagnosis: Left knee OA\par  \par Procedure: Left knee, superolateral, Gel One\par  \par Indication:  The patient has had persistent pain despite conservative treatment.  Risks, benefits and alternatives to procedure were discussed; all questions were answered to the patient's apparent satisfaction and informed consent obtained.  Consent form was signed and dated today.  The patient denied prior problems with local anesthetics, injectable cortisones, chicken allergy, coagulopathy and no relevant drug or preservative allergies or sensitivities.\par  \par The area of injection was prepared in a sterile fashion.  Prior to injection a 'Time Out' was conducted in accordance with University policy and the site and nature of procedure verified with the patient.\par  \par Procedure:\par  \par The injection and aspiration was carried out utilizing sterile technique from a superolateral arthroscopic portal position with needle placement under ultrasound guidance to improve accuracy and minimize risk to the patient and: \par  \par (X) Diagnositic ultrasound in the long and short axis revealed left knee PF OA\par \par 0cc of clear synovial fluid was aspirated.\par The specimen:\par (X) appeared benign and was discarded\par ( ) was sent for Culture / Cell Count / Crystal analysis / [_].\par  \par Injection into the target area with care taken to aspirate frequently to minimize the risk of intravascular injection was performed with:\par  \par ( ) 1cc of Depomedrol (80mg/ml)\par ( ) 1cc of Dexamethasone (10mg/ml)\par ( ) 1cc of Toradol (30mg/ml)\par ( ) 9cc of 0.5% Bupivacaine\par ( ) 1cc of 1% Lidocaine\par ( ) 5cc of 32mg Zilretta, prepared and diluted per  instructions\par ( ) 2 cc of Hylan G-F 20 (Synvisc) 16mg/2ml\par ( ) 6 cc of Hylan G-F 20 (SynvisoOne) 16mg/2ml\par ( ) 2cc of Euflexxa\par ( ) 2cc of Orthovisc\par (X) 2cc of GelOne\par ( ) 3cc of Durolane (20mg/ml)\par  \par Patient tolerated the procedure well and direct pressure was applied for hemostasis. The patient was reminded of potential post-injection risks including, but not limited to, delayed hypersensitivity reactions and/or infection.  The patient verified that they had the office and the Emergency Room's contact information if any problems should arise.  After several minutes, the patient informed me that they felt fine and was released from the office.\par \par \par \par \par The patient's current medication management of their orthopedic diagnosis was reviewed today:\par (1) We discussed a comprehensive treatment plan that included possible pharmaceutical management involving the use of prescription strength medications including but not limited to options such as oral Naprosyn 500mg BID, once daily Meloxicam 15 mg, or 500-650 mg Tylenol versus over the counter oral medications and topical prescription NSAID Pennsaid vs over the counter Voltaren gel.\par (2) There is a moderate risk of morbidity with further treatment, especially from use of prescription strength medications and possible side effects of these medications which include upset stomach with oral medications, skin reactions to topical medications and cardiac/renal issues with long term use.\par (3) I recommended that the patient follow-up with their medical physician to discuss any significant specific potential issues with long term medication use such as interactions with current medications or with exacerbation of underlying medical comorbidities.\par (4) The benefits and risks associated with use of injectable, oral or topical, prescription and over the counter anti-inflammatory medications were discussed with the patient. The patient voiced understanding of the risks including but not limited to bleeding, stroke, kidney dysfunction, heart disease, and were referred to the black box warning label for further information.\par \par \par I, Vernon Filacouris, attest that this documentation has been prepared under the direction and in the presence of Provider Dr. Cisneros\par \par The documentation recorded by the scribe accurately reflects the service I personally performed and the decisions made by me.\par The patient was seen by me under the direct supervision of Dr. Michele Cisneros\par

## 2022-09-09 LAB — CYTOLOGY CVX/VAG DOC THIN PREP: ABNORMAL

## 2022-09-15 ENCOUNTER — APPOINTMENT (OUTPATIENT)
Dept: ORTHOPEDIC SURGERY | Facility: CLINIC | Age: 57
End: 2022-09-15

## 2022-09-15 VITALS — WEIGHT: 132 LBS | BODY MASS INDEX: 22.53 KG/M2 | HEIGHT: 64 IN

## 2022-09-15 PROCEDURE — 20611 DRAIN/INJ JOINT/BURSA W/US: CPT | Mod: RT

## 2022-09-15 PROCEDURE — 99214 OFFICE O/P EST MOD 30 MIN: CPT | Mod: 25

## 2022-09-15 PROCEDURE — 99072 ADDL SUPL MATRL&STAF TM PHE: CPT

## 2022-09-16 NOTE — HISTORY OF PRESENT ILLNESS
[de-identified] : The patient is a 56 year old right hand dominant female who presents today for right knee possible gel injection therapy. \par Date of Injury/Onset: 11/23/2006 \par Pain:    At Rest: 2/10 \par With Activity:  7-8/10 \par Mechanism of injury: Slipped on an object at work and injured her right knee. \par This is a Work Related Injury being treated under Worker's Compensation.\par This is not an athletic injury occurring associated with an interscholastic or organized sports team.\par Quality of symptoms: Grinding, popping, dull and minimal sharp pain \par Improves with: Rest, Ice, heat, Motrin \par Worse with: activities and prolonged sitting, walking and climbing up stairs \par  Treatment/Imaging/Studies Since Last Visit: visco injection for L knee\par 	Reports Available For Review Today: none\par Out of work/sport: Yes, since 08/2012 \par School/Sport/Position/Occupation: RN \par Changes since last visit: No changes. Here for left knee injection.\par Additional Information: [None]\par \par

## 2022-10-01 ENCOUNTER — RESULT REVIEW (OUTPATIENT)
Age: 57
End: 2022-10-01

## 2022-10-06 ENCOUNTER — APPOINTMENT (OUTPATIENT)
Dept: ORTHOPEDIC SURGERY | Facility: CLINIC | Age: 57
End: 2022-10-06

## 2022-10-06 VITALS — HEIGHT: 64 IN | BODY MASS INDEX: 22.53 KG/M2 | WEIGHT: 132 LBS

## 2022-10-06 DIAGNOSIS — M17.10 UNILATERAL PRIMARY OSTEOARTHRITIS, UNSPECIFIED KNEE: ICD-10-CM

## 2022-10-06 PROCEDURE — 99072 ADDL SUPL MATRL&STAF TM PHE: CPT

## 2022-10-06 PROCEDURE — 99213 OFFICE O/P EST LOW 20 MIN: CPT

## 2022-10-07 PROBLEM — M17.10 ARTHRITIS OF KNEE: Status: ACTIVE | Noted: 2022-09-15

## 2022-10-07 NOTE — IMAGING
[de-identified] : The patient is a well appearing 56 year old F of their stated age.\par Patient ambulates with a normal gait.\par Negative straight leg raise bilateral\par \par Effected Knee:  LEFT                      	\par ROM:  0-145 degrees\par Lachman: Negative\par Pivot Shift: Negative\par Anterior Drawer: Negative\par Posterior Drawer / Sag:Negative\par Varus Stress 0 degrees: Stable\par Varus Stress 30 degrees: Stable\par Valgus Stress 0 degrees: Stable\par Valgus Stress 30 degrees: Stable\par Medial Anali: Negative\par Lateral Anali: Negative\par Patella Glide: 2+\par Patella Apprehension: Negative\par Patella Grind: Negative\par \par Palpation:\par Medial Joint Line: Nontender\par Lateral Joint Line: Nontender\par Medial Collateral Ligament: Nontender\par Lateral Collateral Ligament/PLC: Nontender\par Medial Femoral Condyle: Nontender\par Medial Retinaculum: Nontender\par Distal Femur: Nontender\par Proximal Tibia: Nontender\par Tibial Tubercle: Nontender\par Distal Pole Patella: Nontender\par Quadriceps Tendon: Nontender &  Intact\par Patella Tendon: Nontender &  Intact\par Lateral Patella Facet: tender\par Medial Distal Hamstring/PES: Nontender\par Lateral Distal Hamstring: Nontender & Stable\par Iliotibial Band: Nontender\par Medial Patellofemoral Ligament: Nontender\par Adductor: Nontender\par Proximal GSC-Plantaris: Nontender\par Calf: Supple & Nontender\par \par Inspection:\par Deformity: No\par Erythema: No\par Ecchymosis: No\par Abrasions: No\par Effusion: mild\par Prepatella Bursitis: No\par Neurologic Exam:\par Sensation L4-S1: Grossly Intact\par \par Motor Exam:\par Quadriceps: 5 out of 5\par Hamstrings: 5 out of 5\par EHL: 5 out of 5\par FHL: 5 out of 5\par TA: 5 out of 5\par GS: 5 out of 5\par Circulatory/Pulses:\par Dorsalis Pedis: 2+\par Posterior Tibialis: 2+\par Additional Pertinent Findings: None\par Contralateral Knee:                           	\par ROM: 0-145 degrees\par Other Pertinent Findings: None\par \par \par Assessment: The patient is a 56 year old F with left knee PF OA\par \par The patient’s condition is acute\par Documents/Results Reviewed Today: MRI left knee and MRI right knee \par Tests/Studies Independently Interpreted Today: MRI left knee reveals degenerative fraying of the lateral meniscus, PF OA, tricompartmental OA -- MRI right knee reveals status post ACL reconstruction, cyclops lesion, tricompartmental OA\par Pertinent findings include: Left knee: mild effusion, lateral patella facet tenderness \par Confounding medical conditions/concerns: Sx left knee patella fx - 2001\par \par Plan: Patient will begin use of DonJoy OA Reaction knee brace. Modify activity as discussed. Continue HEP and stretching. \par Tests Ordered: none\par Prescription Medications Ordered: none\par Braces/DME Ordered: DonJoy OA Reaction knee brace\par Activity/Work/Sports Status: retired/disabled\par Additional Instructions: none\par Follow-Up: 6 weeks\par \par The patient's current medication management of their orthopedic diagnosis was reviewed today:\par (1) We discussed a comprehensive treatment plan that included possible pharmaceutical management involving the use of prescription strength medications including but not limited to options such as oral Naprosyn 500mg BID, once daily Meloxicam 15 mg, or 500-650 mg Tylenol versus over the counter oral medications and topical prescription NSAID Pennsaid vs over the counter Voltaren gel.\par (2) There is a moderate risk of morbidity with further treatment, especially from use of prescription strength medications and possible side effects of these medications which include upset stomach with oral medications, skin reactions to topical medications and cardiac/renal issues with long term use.\par (3) I recommended that the patient follow-up with their medical physician to discuss any significant specific potential issues with long term medication use such as interactions with current medications or with exacerbation of underlying medical comorbidities.\par (4) The benefits and risks associated with use of injectable, oral or topical, prescription and over the counter anti-inflammatory medications were discussed with the patient. The patient voiced understanding of the risks including but not limited to bleeding, stroke, kidney dysfunction, heart disease, and were referred to the black box warning label for further information.\par \par I, Juli Ruffin, attest that this documentation has been prepared under the direction and in the presence of Provider Dr. Michele Cisneros.\par \par \par The documentation recorded by the scribe accurately reflects the service I personally performed and the decisions made by me.\par The patient was seen by me under the direct supervision of Dr. Michele Cisneros\par

## 2022-10-07 NOTE — DATA REVIEWED
[MRI] : MRI [Left] : left [Knee] : knee [Report was reviewed and noted in the chart] : The report was reviewed and noted in the chart [I independently reviewed and interpreted images and report] : I independently reviewed and interpreted images and report [I reviewed the films/CD and additionally noted] : I reviewed the films/CD and additionally noted [FreeTextEntry1] :  degenerative fraying of the lateral meniscus, PF OA, tricompartmental OA  [FreeTextEntry2] : status post ACL reconstruction, cyclops lesion, tricompartmental OA

## 2022-10-07 NOTE — HISTORY OF PRESENT ILLNESS
[de-identified] : The patient is a 56 year old right hand dominant female who presents today for right knee follow-up. \par Date of Injury/Onset: 11/23/2006 \par Pain:    At Rest: 2/10 \par With Activity:  7-8/10 \par Mechanism of injury: Slipped on an object at work and injured her right knee. \par This is a Work Related Injury being treated under Worker's Compensation.\par This is not an athletic injury occurring associated with an interscholastic or organized sports team.\par Quality of symptoms: Grinding, popping, dull and minimal sharp pain \par Improves with: Rest, Ice, heat, Motrin \par Worse with: activities and prolonged sitting, walking and climbing up stairs \par  Treatment/Imaging/Studies Since Last Visit: Gel-one injection for right knee\par 	Reports Available For Review Today: none\par Out of work/sport: Yes, since 08/2012 \par School/Sport/Position/Occupation: RN \par Changes since last visit: States her knee has been buckling more than usual since she received the gel injection. Pain is the same. \par Additional Information: [None]\par \par

## 2022-10-26 ENCOUNTER — NON-APPOINTMENT (OUTPATIENT)
Age: 57
End: 2022-10-26

## 2022-11-10 ENCOUNTER — APPOINTMENT (OUTPATIENT)
Dept: ORTHOPEDIC SURGERY | Facility: CLINIC | Age: 57
End: 2022-11-10

## 2022-11-10 VITALS — WEIGHT: 132 LBS | HEIGHT: 64 IN | BODY MASS INDEX: 22.53 KG/M2

## 2022-11-10 DIAGNOSIS — M17.12 UNILATERAL PRIMARY OSTEOARTHRITIS, LEFT KNEE: ICD-10-CM

## 2022-11-10 PROCEDURE — 99072 ADDL SUPL MATRL&STAF TM PHE: CPT

## 2022-11-10 PROCEDURE — 99213 OFFICE O/P EST LOW 20 MIN: CPT

## 2022-11-11 NOTE — IMAGING
[de-identified] : The patient is a well appearing 56 year old F of their stated age.\par Patient ambulates with a normal gait.\par Negative straight leg raise bilateral\par \par Effected Knee:  LEFT                      	\par ROM:  0-145 degrees\par Lachman: Negative\par Pivot Shift: Negative\par Anterior Drawer: Negative\par Posterior Drawer / Sag:Negative\par Varus Stress 0 degrees: Stable\par Varus Stress 30 degrees: Stable\par Valgus Stress 0 degrees: Stable\par Valgus Stress 30 degrees: Stable\par Medial Anali: Negative\par Lateral Anali: Negative\par Patella Glide: 2+\par Patella Apprehension: Negative\par Patella Grind: Negative\par \par Palpation:\par Medial Joint Line: Nontender\par Lateral Joint Line: Nontender\par Medial Collateral Ligament: Nontender\par Lateral Collateral Ligament/PLC: Nontender\par Medial Femoral Condyle: Nontender\par Medial Retinaculum: Nontender\par Distal Femur: Nontender\par Proximal Tibia: Nontender\par Tibial Tubercle: Nontender\par Distal Pole Patella: Nontender\par Quadriceps Tendon: Nontender &  Intact\par Patella Tendon: Nontender &  Intact\par Lateral Patella Facet: tender\par Medial Distal Hamstring/PES: Nontender\par Lateral Distal Hamstring: Nontender & Stable\par Iliotibial Band: Nontender\par Medial Patellofemoral Ligament: Nontender\par Adductor: Nontender\par Proximal GSC-Plantaris: Nontender\par Calf: Supple & Nontender\par \par Inspection:\par Deformity: No\par Erythema: No\par Ecchymosis: No\par Abrasions: No\par Effusion: mild\par Prepatella Bursitis: No\par Neurologic Exam:\par Sensation L4-S1: Grossly Intact\par \par Motor Exam:\par Quadriceps: 5 out of 5\par Hamstrings: 5 out of 5\par EHL: 5 out of 5\par FHL: 5 out of 5\par TA: 5 out of 5\par GS: 5 out of 5\par Circulatory/Pulses:\par Dorsalis Pedis: 2+\par Posterior Tibialis: 2+\par Additional Pertinent Findings: None\par Contralateral Knee:                           	\par ROM: 0-145 degrees\par Other Pertinent Findings: None\par \par \par Assessment: The patient is a 56 year old F with left knee PF OA\par \par The patient’s condition is acute\par Documents/Results Reviewed Today: Re-reviewed MRI left knee \par Tests/Studies Independently Interpreted Today: Re-reviewed MRI left knee reveals degenerative fraying of the lateral meniscus, PF OA, tricompartmental OA \par Pertinent findings include: Left knee: mild effusion, lateral patella facet tenderness \par Confounding medical conditions/concerns: Sx left knee patella fx - 2001\par \par Plan: Patient had improvement from GelOne injections. Continue reaction knee brace. Modify activity as discussed.\par Tests Ordered: none\par Prescription Medications Ordered: none\par Braces/DME Ordered: none\par Activity/Work/Sports Status: retired/disabled\par Additional Instructions: none\par Follow-Up: 8-12 weeks \par \par \par The patient's current medication management of their orthopedic diagnosis was reviewed today:\par (1) We discussed a comprehensive treatment plan that included possible pharmaceutical management involving the use of prescription strength medications including but not limited to options such as oral Naprosyn 500mg BID, once daily Meloxicam 15 mg, or 500-650 mg Tylenol versus over the counter oral medications and topical prescription NSAID Pennsaid vs over the counter Voltaren gel.\par (2) There is a moderate risk of morbidity with further treatment, especially from use of prescription strength medications and possible side effects of these medications which include upset stomach with oral medications, skin reactions to topical medications and cardiac/renal issues with long term use.\par (3) I recommended that the patient follow-up with their medical physician to discuss any significant specific potential issues with long term medication use such as interactions with current medications or with exacerbation of underlying medical comorbidities.\par (4) The benefits and risks associated with use of injectable, oral or topical, prescription and over the counter anti-inflammatory medications were discussed with the patient. The patient voiced understanding of the risks including but not limited to bleeding, stroke, kidney dysfunction, heart disease, and were referred to the black box warning label for further information.\par \par \par I, Juli Ruffin, attest that this documentation has been prepared under the direction and in the presence of Provider Dr. Michele Cisneros.\par \par The documentation recorded by the scribe accurately reflects the service I personally performed and the decisions made by me.\par The patient was seen by me under the direct supervision of Dr. Michele Cisneros\par \par

## 2022-11-11 NOTE — HISTORY OF PRESENT ILLNESS
[de-identified] : The patient is a 56 year old right hand dominant female who presents today for left knee pain. \par Date of Injury/Onset: 11/23/2006 \par Pain: At Rest: 2/10 \par With Activity: 7-8/10 \par Mechanism of injury: Slipped on an object at work and injured her right knee. \par This is a Work Related Injury being treated under Worker's Compensation.\par This is not an athletic injury occurring associated with an interscholastic or organized sports team.\par Quality of symptoms: Grinding, popping, dull and minimal sharp pain. \par Improves with: Rest, Ice, heat, Motrin, reaction knee brace.  \par Worse with: Activities, prolonged sitting, walking, and climbing up stairs \par Out of work/sport: Yes, since 08/2012 \par School/Sport/Position/Occupation: RN \par Changes since last visit: Patient is progressing. \par Additional Information: [None]\par \par

## 2022-12-22 ENCOUNTER — APPOINTMENT (OUTPATIENT)
Dept: ORTHOPEDIC SURGERY | Facility: CLINIC | Age: 57
End: 2022-12-22

## 2023-03-23 ENCOUNTER — APPOINTMENT (OUTPATIENT)
Dept: ORTHOPEDIC SURGERY | Facility: CLINIC | Age: 58
End: 2023-03-23

## 2024-10-31 ENCOUNTER — APPOINTMENT (OUTPATIENT)
Dept: ORTHOPEDIC SURGERY | Facility: CLINIC | Age: 59
End: 2024-10-31
Payer: OTHER MISCELLANEOUS

## 2024-10-31 VITALS — WEIGHT: 132 LBS | HEIGHT: 64 IN | BODY MASS INDEX: 22.53 KG/M2

## 2024-10-31 DIAGNOSIS — M17.10 UNILATERAL PRIMARY OSTEOARTHRITIS, UNSPECIFIED KNEE: ICD-10-CM

## 2024-10-31 DIAGNOSIS — M25.561 PAIN IN RIGHT KNEE: ICD-10-CM

## 2024-10-31 PROCEDURE — 99214 OFFICE O/P EST MOD 30 MIN: CPT | Mod: 25

## 2024-10-31 PROCEDURE — 20610 DRAIN/INJ JOINT/BURSA W/O US: CPT | Mod: RT

## 2024-10-31 PROCEDURE — J3490M: CUSTOM

## 2024-10-31 PROCEDURE — 73564 X-RAY EXAM KNEE 4 OR MORE: CPT | Mod: RT

## 2024-11-22 ENCOUNTER — APPOINTMENT (OUTPATIENT)
Dept: ORTHOPEDIC SURGERY | Facility: CLINIC | Age: 59
End: 2024-11-22

## 2024-12-05 ENCOUNTER — APPOINTMENT (OUTPATIENT)
Dept: ORTHOPEDIC SURGERY | Facility: CLINIC | Age: 59
End: 2024-12-05
Payer: OTHER MISCELLANEOUS

## 2024-12-05 VITALS — WEIGHT: 132 LBS | HEIGHT: 64 IN | BODY MASS INDEX: 22.53 KG/M2

## 2024-12-05 DIAGNOSIS — M17.10 UNILATERAL PRIMARY OSTEOARTHRITIS, UNSPECIFIED KNEE: ICD-10-CM

## 2024-12-05 PROCEDURE — 99213 OFFICE O/P EST LOW 20 MIN: CPT

## 2024-12-19 ENCOUNTER — APPOINTMENT (OUTPATIENT)
Dept: ORTHOPEDIC SURGERY | Facility: CLINIC | Age: 59
End: 2024-12-19
Payer: OTHER MISCELLANEOUS

## 2024-12-19 VITALS — HEIGHT: 64 IN | BODY MASS INDEX: 22.53 KG/M2 | WEIGHT: 132 LBS

## 2024-12-19 DIAGNOSIS — M25.561 PAIN IN RIGHT KNEE: ICD-10-CM

## 2024-12-19 DIAGNOSIS — M17.10 UNILATERAL PRIMARY OSTEOARTHRITIS, UNSPECIFIED KNEE: ICD-10-CM

## 2024-12-19 PROCEDURE — 20611 DRAIN/INJ JOINT/BURSA W/US: CPT | Mod: RT

## 2025-01-23 ENCOUNTER — APPOINTMENT (OUTPATIENT)
Dept: ORTHOPEDIC SURGERY | Facility: CLINIC | Age: 60
End: 2025-01-23
Payer: OTHER MISCELLANEOUS

## 2025-01-23 VITALS — BODY MASS INDEX: 22.53 KG/M2 | WEIGHT: 132 LBS | HEIGHT: 64 IN

## 2025-01-23 DIAGNOSIS — M25.561 PAIN IN RIGHT KNEE: ICD-10-CM

## 2025-01-23 PROCEDURE — 99214 OFFICE O/P EST MOD 30 MIN: CPT

## 2025-01-27 ENCOUNTER — OFFICE (OUTPATIENT)
Dept: URBAN - METROPOLITAN AREA CLINIC 104 | Facility: CLINIC | Age: 60
Setting detail: OPHTHALMOLOGY
End: 2025-01-27
Payer: MEDICARE

## 2025-01-27 ENCOUNTER — RX ONLY (RX ONLY)
Age: 60
End: 2025-01-27

## 2025-01-27 DIAGNOSIS — H43.821: ICD-10-CM

## 2025-01-27 DIAGNOSIS — H35.3121: ICD-10-CM

## 2025-01-27 DIAGNOSIS — H25.11: ICD-10-CM

## 2025-01-27 DIAGNOSIS — H25.13: ICD-10-CM

## 2025-01-27 DIAGNOSIS — H16.223: ICD-10-CM

## 2025-01-27 PROBLEM — H17.9 CORNEAL SCAR: Status: ACTIVE | Noted: 2025-01-27

## 2025-01-27 PROBLEM — H43.813 POSTERIOR VITREOUS DETACHMENT; BOTH EYES: Status: ACTIVE | Noted: 2025-01-27

## 2025-01-27 PROCEDURE — 92134 CPTRZ OPH DX IMG PST SGM RTA: CPT | Performed by: OPHTHALMOLOGY

## 2025-01-27 PROCEDURE — 92136 OPHTHALMIC BIOMETRY: CPT | Mod: TC | Performed by: OPHTHALMOLOGY

## 2025-01-27 PROCEDURE — 92136 OPHTHALMIC BIOMETRY: CPT | Mod: 26,RT | Performed by: OPHTHALMOLOGY

## 2025-01-27 PROCEDURE — 99204 OFFICE O/P NEW MOD 45 MIN: CPT | Performed by: OPHTHALMOLOGY

## 2025-01-27 ASSESSMENT — KERATOMETRY
OD_K1POWER_DIOPTERS: 41.36
OS_AXISANGLE_DEGREES: 112
OD_AXISANGLE_DEGREES: 050
OD_K2POWER_DIOPTERS: 42.72
OS_K2POWER_DIOPTERS: 43.89
OS_K1POWER_DIOPTERS: 41.82

## 2025-01-27 ASSESSMENT — REFRACTION_CURRENTRX
OD_ADD: +2.25
OD_CYLINDER: -0.75
OS_ADD: +2.25
OS_OVR_VA: 20/
OS_AXIS: 035
OS_CYLINDER: -1.75
OD_SPHERE: +0.50
OS_SPHERE: +0.75
OD_AXIS: 129
OD_OVR_VA: 20/

## 2025-01-27 ASSESSMENT — REFRACTION_MANIFEST
OS_VA1: 20/50
OD_CYLINDER: -0.75
OS_AXIS: 035
OD_SPHERE: +0.50
OS_CYLINDER: -1.75
OS_SPHERE: +0.75
OD_AXIS: 129
OD_VA1: 20/50

## 2025-01-27 ASSESSMENT — VISUAL ACUITY
OS_BCVA: 20/50
OD_BCVA: 20/50

## 2025-01-27 ASSESSMENT — SUPERFICIAL PUNCTATE KERATITIS (SPK)
OD_SPK: 2+
OS_SPK: 2+

## 2025-01-27 ASSESSMENT — REFRACTION_AUTOREFRACTION
OD_AXIS: 118
OD_CYLINDER: -1.50
OS_AXIS: 031
OS_SPHERE: +1.50
OD_SPHERE: +1.50
OS_CYLINDER: -2.25

## 2025-01-27 ASSESSMENT — CONFRONTATIONAL VISUAL FIELD TEST (CVF)
OD_FINDINGS: FULL
OS_FINDINGS: FULL

## 2025-02-03 DIAGNOSIS — M17.10 UNILATERAL PRIMARY OSTEOARTHRITIS, UNSPECIFIED KNEE: ICD-10-CM

## 2025-02-03 RX ORDER — DICLOFENAC EPOLAMINE 0.01 G/1
1.3 SYSTEM TOPICAL TWICE DAILY
Qty: 30 | Refills: 0 | Status: ACTIVE | COMMUNITY
Start: 2025-02-03 | End: 1900-01-01

## 2025-02-06 ENCOUNTER — OFFICE (OUTPATIENT)
Dept: URBAN - METROPOLITAN AREA CLINIC 88 | Facility: CLINIC | Age: 60
Setting detail: OPHTHALMOLOGY
End: 2025-02-06
Payer: MEDICARE

## 2025-02-06 DIAGNOSIS — H35.712: ICD-10-CM

## 2025-02-06 DIAGNOSIS — D31.31: ICD-10-CM

## 2025-02-06 DIAGNOSIS — H43.813: ICD-10-CM

## 2025-02-06 PROCEDURE — 76512 OPH US DX B-SCAN: CPT | Mod: 50 | Performed by: OPHTHALMOLOGY

## 2025-02-06 PROCEDURE — 92134 CPTRZ OPH DX IMG PST SGM RTA: CPT | Performed by: OPHTHALMOLOGY

## 2025-02-06 PROCEDURE — 92014 COMPRE OPH EXAM EST PT 1/>: CPT | Performed by: OPHTHALMOLOGY

## 2025-02-06 PROCEDURE — 92235 FLUORESCEIN ANGRPH MLTIFRAME: CPT | Performed by: OPHTHALMOLOGY

## 2025-02-06 ASSESSMENT — SUPERFICIAL PUNCTATE KERATITIS (SPK)
OS_SPK: 2+
OD_SPK: 2+

## 2025-02-06 ASSESSMENT — CONFRONTATIONAL VISUAL FIELD TEST (CVF)
OS_FINDINGS: FULL
OD_FINDINGS: FULL

## 2025-02-13 ENCOUNTER — APPOINTMENT (OUTPATIENT)
Dept: ORTHOPEDIC SURGERY | Facility: CLINIC | Age: 60
End: 2025-02-13

## 2025-02-14 ASSESSMENT — VISUAL ACUITY
OS_BCVA: 20/50-
OD_BCVA: 20/40

## 2025-02-14 ASSESSMENT — REFRACTION_AUTOREFRACTION
OS_SPHERE: +1.50
OD_CYLINDER: -1.50
OD_AXIS: 118
OS_CYLINDER: -2.25
OD_SPHERE: +1.50
OS_AXIS: 031

## 2025-02-14 ASSESSMENT — KERATOMETRY
OS_AXISANGLE_DEGREES: 112
OS_K2POWER_DIOPTERS: 43.89
OD_K2POWER_DIOPTERS: 42.72
OS_K1POWER_DIOPTERS: 41.82
OD_AXISANGLE_DEGREES: 050
OD_K1POWER_DIOPTERS: 41.36